# Patient Record
Sex: FEMALE | Race: WHITE
[De-identification: names, ages, dates, MRNs, and addresses within clinical notes are randomized per-mention and may not be internally consistent; named-entity substitution may affect disease eponyms.]

---

## 2017-11-12 ENCOUNTER — HOSPITAL ENCOUNTER (EMERGENCY)
Dept: HOSPITAL 1 - ED | Age: 27
Discharge: LEFT BEFORE DISCHARGE | End: 2017-11-12
Payer: COMMERCIAL

## 2017-11-12 VITALS — SYSTOLIC BLOOD PRESSURE: 119 MMHG | DIASTOLIC BLOOD PRESSURE: 73 MMHG

## 2017-11-12 DIAGNOSIS — Z88.2: ICD-10-CM

## 2017-11-12 DIAGNOSIS — F17.210: ICD-10-CM

## 2017-11-12 DIAGNOSIS — Z88.6: ICD-10-CM

## 2017-11-12 DIAGNOSIS — M54.9: Primary | ICD-10-CM

## 2017-11-12 DIAGNOSIS — M79.7: ICD-10-CM

## 2017-11-12 DIAGNOSIS — F17.200: ICD-10-CM

## 2017-11-12 LAB
MICROSCOPIC UR-IMP: NO
RBC # UR STRIP.AUTO: NEGATIVE /UL
UA SPECIFIC GRAVITY: 1.02 (ref 1–1.03)

## 2018-03-19 ENCOUNTER — HOSPITAL ENCOUNTER (EMERGENCY)
Dept: HOSPITAL 1 - ED | Age: 28
Discharge: LEFT BEFORE DISCHARGE | End: 2018-03-19
Payer: COMMERCIAL

## 2018-03-19 VITALS
BODY MASS INDEX: 31.34 KG/M2 | HEIGHT: 60.98 IN | WEIGHT: 165.99 LBS | BODY MASS INDEX: 31.34 KG/M2 | WEIGHT: 165.99 LBS | HEIGHT: 60.98 IN

## 2018-03-19 VITALS — SYSTOLIC BLOOD PRESSURE: 137 MMHG | DIASTOLIC BLOOD PRESSURE: 91 MMHG

## 2018-03-19 DIAGNOSIS — Z53.21: Primary | ICD-10-CM

## 2018-10-10 ENCOUNTER — HOSPITAL ENCOUNTER (EMERGENCY)
Dept: HOSPITAL 1 - ED | Age: 28
Discharge: HOME | End: 2018-10-10
Payer: SELF-PAY

## 2018-10-10 VITALS — SYSTOLIC BLOOD PRESSURE: 122 MMHG | DIASTOLIC BLOOD PRESSURE: 78 MMHG

## 2018-10-10 VITALS — BODY MASS INDEX: 31.41 KG/M2 | WEIGHT: 166.37 LBS | HEIGHT: 61 IN

## 2018-10-10 DIAGNOSIS — Z98.890: ICD-10-CM

## 2018-10-10 DIAGNOSIS — Z88.5: ICD-10-CM

## 2018-10-10 DIAGNOSIS — Z98.51: ICD-10-CM

## 2018-10-10 DIAGNOSIS — R07.89: Primary | ICD-10-CM

## 2018-10-10 DIAGNOSIS — Z88.2: ICD-10-CM

## 2018-12-23 ENCOUNTER — HOSPITAL ENCOUNTER (EMERGENCY)
Dept: HOSPITAL 1 - ED | Age: 28
Discharge: HOME | End: 2018-12-23
Payer: MEDICAID

## 2018-12-23 VITALS — WEIGHT: 160.31 LBS | BODY MASS INDEX: 28.41 KG/M2 | HEIGHT: 63 IN

## 2018-12-23 VITALS — DIASTOLIC BLOOD PRESSURE: 68 MMHG | SYSTOLIC BLOOD PRESSURE: 118 MMHG

## 2018-12-23 DIAGNOSIS — N39.3: Primary | ICD-10-CM

## 2018-12-23 DIAGNOSIS — Z88.2: ICD-10-CM

## 2018-12-23 DIAGNOSIS — Z88.5: ICD-10-CM

## 2018-12-23 DIAGNOSIS — Z98.890: ICD-10-CM

## 2018-12-23 DIAGNOSIS — F17.210: ICD-10-CM

## 2018-12-23 DIAGNOSIS — Z98.51: ICD-10-CM

## 2019-01-02 ENCOUNTER — HOSPITAL ENCOUNTER (EMERGENCY)
Dept: HOSPITAL 1 - ED | Age: 29
Discharge: HOME | End: 2019-01-02
Payer: MEDICAID

## 2019-01-02 VITALS — WEIGHT: 159 LBS | HEIGHT: 61 IN | BODY MASS INDEX: 30.02 KG/M2

## 2019-01-02 VITALS — DIASTOLIC BLOOD PRESSURE: 65 MMHG | SYSTOLIC BLOOD PRESSURE: 117 MMHG

## 2019-01-02 DIAGNOSIS — F41.9: ICD-10-CM

## 2019-01-02 DIAGNOSIS — Z13.89: ICD-10-CM

## 2019-01-02 DIAGNOSIS — Z98.51: ICD-10-CM

## 2019-01-02 DIAGNOSIS — M54.2: Primary | ICD-10-CM

## 2019-01-02 DIAGNOSIS — Z98.890: ICD-10-CM

## 2019-01-02 DIAGNOSIS — Z88.2: ICD-10-CM

## 2019-01-08 ENCOUNTER — HOSPITAL ENCOUNTER (EMERGENCY)
Dept: HOSPITAL 1 - ED | Age: 29
Discharge: LEFT BEFORE DISCHARGE | End: 2019-01-08
Payer: MEDICAID

## 2019-01-08 VITALS — WEIGHT: 159 LBS | BODY MASS INDEX: 30.02 KG/M2 | HEIGHT: 61 IN

## 2019-01-08 VITALS — DIASTOLIC BLOOD PRESSURE: 83 MMHG | SYSTOLIC BLOOD PRESSURE: 137 MMHG

## 2019-01-08 DIAGNOSIS — Z53.21: Primary | ICD-10-CM

## 2020-07-23 ENCOUNTER — HOSPITAL ENCOUNTER (EMERGENCY)
Dept: HOSPITAL 1 - ED | Age: 30
Discharge: HOME | End: 2020-07-23
Payer: COMMERCIAL

## 2020-07-23 VITALS — BODY MASS INDEX: 26.81 KG/M2 | WEIGHT: 142 LBS | HEIGHT: 61 IN

## 2020-07-23 VITALS — SYSTOLIC BLOOD PRESSURE: 118 MMHG | DIASTOLIC BLOOD PRESSURE: 81 MMHG

## 2020-07-23 DIAGNOSIS — Z98.890: ICD-10-CM

## 2020-07-23 DIAGNOSIS — H10.89: Primary | ICD-10-CM

## 2020-07-23 DIAGNOSIS — Z88.2: ICD-10-CM

## 2020-07-23 DIAGNOSIS — Z88.5: ICD-10-CM

## 2021-03-03 ENCOUNTER — HOSPITAL ENCOUNTER (EMERGENCY)
Dept: HOSPITAL 1 - ED | Age: 31
Discharge: HOME | End: 2021-03-03
Payer: COMMERCIAL

## 2021-03-03 VITALS — WEIGHT: 140 LBS | BODY MASS INDEX: 27.48 KG/M2 | HEIGHT: 60 IN

## 2021-03-03 VITALS — DIASTOLIC BLOOD PRESSURE: 77 MMHG | SYSTOLIC BLOOD PRESSURE: 126 MMHG

## 2021-03-03 DIAGNOSIS — Z98.51: ICD-10-CM

## 2021-03-03 DIAGNOSIS — Z88.2: ICD-10-CM

## 2021-03-03 DIAGNOSIS — Z88.5: ICD-10-CM

## 2021-03-03 DIAGNOSIS — Z98.890: ICD-10-CM

## 2021-03-03 DIAGNOSIS — N39.0: Primary | ICD-10-CM

## 2022-01-13 ENCOUNTER — HOSPITAL ENCOUNTER (EMERGENCY)
Dept: HOSPITAL 26 - MED | Age: 32
LOS: 1 days | Discharge: HOME | End: 2022-01-14
Payer: COMMERCIAL

## 2022-01-13 ENCOUNTER — HOSPITAL ENCOUNTER (EMERGENCY)
Dept: HOSPITAL 26 - MED | Age: 32
Discharge: LEFT BEFORE DISCHARGE | End: 2022-01-13
Payer: COMMERCIAL

## 2022-01-13 VITALS — BODY MASS INDEX: 30.02 KG/M2 | WEIGHT: 159 LBS | HEIGHT: 61 IN

## 2022-01-13 VITALS — SYSTOLIC BLOOD PRESSURE: 108 MMHG | DIASTOLIC BLOOD PRESSURE: 84 MMHG

## 2022-01-13 VITALS — SYSTOLIC BLOOD PRESSURE: 112 MMHG | DIASTOLIC BLOOD PRESSURE: 88 MMHG

## 2022-01-13 DIAGNOSIS — Z88.2: ICD-10-CM

## 2022-01-13 DIAGNOSIS — Z53.21: ICD-10-CM

## 2022-01-13 DIAGNOSIS — R30.9: ICD-10-CM

## 2022-01-13 DIAGNOSIS — N39.0: ICD-10-CM

## 2022-01-13 DIAGNOSIS — N89.8: ICD-10-CM

## 2022-01-13 DIAGNOSIS — Z88.6: ICD-10-CM

## 2022-01-13 DIAGNOSIS — Z79.899: ICD-10-CM

## 2022-01-13 DIAGNOSIS — T74.21XA: Primary | ICD-10-CM

## 2022-01-13 DIAGNOSIS — R10.2: Primary | ICD-10-CM

## 2022-01-13 DIAGNOSIS — Z88.5: ICD-10-CM

## 2022-01-13 LAB
APPEARANCE UR: (no result)
BILIRUB UR QL STRIP: NEGATIVE
COLOR UR: YELLOW
GLUCOSE UR STRIP-MCNC: NEGATIVE MG/DL
HGB UR QL STRIP: (no result)
LEUKOCYTE ESTERASE UR QL STRIP: (no result)
NITRITE UR QL STRIP: POSITIVE
PH UR STRIP: 6 [PH] (ref 5–9)

## 2022-01-13 NOTE — NUR
RECEIVED CALL FROM SABINA RANGEL  WHO STATED PT LEFT FACILITY AT THIS 
TIME. PATIENT LEFT WITHOUT BEING SEEN BY DR. NIEVES. NO FURTHER CARE PROVIDED 
FOR PATIENT.

## 2022-01-14 VITALS — DIASTOLIC BLOOD PRESSURE: 86 MMHG | SYSTOLIC BLOOD PRESSURE: 114 MMHG

## 2022-01-14 LAB
RBC #/AREA URNS HPF: (no result) /HPF (ref 0–5)
WBC,URINE: (no result) /HPF (ref 0–5)

## 2022-01-14 RX ADMIN — DOXYCYCLINE HYCLATE ONE MG: 100 CAPSULE, GELATIN COATED ORAL at 00:21

## 2022-01-14 RX ADMIN — LIDOCAINE HYDROCHLORIDE ONE MLS/HR: 10 INJECTION, SOLUTION EPIDURAL; INFILTRATION; INTRACAUDAL; PERINEURAL at 00:20

## 2022-09-16 ENCOUNTER — HOSPITAL ENCOUNTER (EMERGENCY)
Dept: HOSPITAL 26 - MED | Age: 32
LOS: 1 days | Discharge: HOME | End: 2022-09-17
Payer: COMMERCIAL

## 2022-09-16 VITALS — HEIGHT: 61 IN | WEIGHT: 130 LBS | BODY MASS INDEX: 24.55 KG/M2

## 2022-09-16 VITALS — SYSTOLIC BLOOD PRESSURE: 123 MMHG | DIASTOLIC BLOOD PRESSURE: 83 MMHG

## 2022-09-16 DIAGNOSIS — Z88.2: ICD-10-CM

## 2022-09-16 DIAGNOSIS — Z79.1: ICD-10-CM

## 2022-09-16 DIAGNOSIS — N76.0: Primary | ICD-10-CM

## 2022-09-16 DIAGNOSIS — Z79.891: ICD-10-CM

## 2022-09-16 PROCEDURE — 87086 URINE CULTURE/COLONY COUNT: CPT

## 2022-09-16 PROCEDURE — 99283 EMERGENCY DEPT VISIT LOW MDM: CPT

## 2022-09-16 PROCEDURE — 81001 URINALYSIS AUTO W/SCOPE: CPT

## 2022-09-16 PROCEDURE — 87491 CHLMYD TRACH DNA AMP PROBE: CPT

## 2022-09-17 VITALS — DIASTOLIC BLOOD PRESSURE: 83 MMHG | SYSTOLIC BLOOD PRESSURE: 123 MMHG

## 2022-09-17 LAB
APPEARANCE UR: (no result)
BILIRUB UR QL STRIP: NEGATIVE
COLOR UR: (no result)
GLUCOSE UR STRIP-MCNC: NEGATIVE MG/DL
HGB UR QL STRIP: (no result)
LEUKOCYTE ESTERASE UR QL STRIP: (no result)
NITRITE UR QL STRIP: NEGATIVE
PH UR STRIP: 6 [PH] (ref 5–9)
RBC #/AREA URNS HPF: (no result) /HPF (ref 0–5)
WBC,URINE: (no result) /HPF (ref 0–5)